# Patient Record
Sex: FEMALE | Race: WHITE | Employment: FULL TIME | URBAN - NONMETROPOLITAN AREA
[De-identification: names, ages, dates, MRNs, and addresses within clinical notes are randomized per-mention and may not be internally consistent; named-entity substitution may affect disease eponyms.]

---

## 2017-10-24 ENCOUNTER — HOSPITAL ENCOUNTER (OUTPATIENT)
Dept: WOMENS IMAGING | Age: 42
Discharge: HOME | End: 2017-10-24
Payer: COMMERCIAL

## 2017-10-24 DIAGNOSIS — Z12.31 VISIT FOR SCREENING MAMMOGRAM: ICD-10-CM

## 2017-10-24 PROCEDURE — 77063 BREAST TOMOSYNTHESIS BI: CPT

## 2020-12-15 ENCOUNTER — OFFICE VISIT (OUTPATIENT)
Dept: OBGYN CLINIC | Age: 45
End: 2020-12-15
Payer: COMMERCIAL

## 2020-12-15 VITALS
SYSTOLIC BLOOD PRESSURE: 104 MMHG | BODY MASS INDEX: 28.12 KG/M2 | HEIGHT: 65 IN | DIASTOLIC BLOOD PRESSURE: 74 MMHG | WEIGHT: 168.8 LBS

## 2020-12-15 PROCEDURE — 99213 OFFICE O/P EST LOW 20 MIN: CPT | Performed by: NURSE PRACTITIONER

## 2020-12-15 RX ORDER — CEPHALEXIN 500 MG/1
500 CAPSULE ORAL 2 TIMES DAILY
Qty: 14 CAPSULE | Refills: 0 | Status: SHIPPED | OUTPATIENT
Start: 2020-12-15 | End: 2020-12-22

## 2020-12-15 ASSESSMENT — ENCOUNTER SYMPTOMS: ROS SKIN COMMENTS: LUMP IN LEFT ARMPIT

## 2020-12-15 NOTE — PROGRESS NOTES
PROBLEM VISIT     Date of service: 12/15/2020    Crystal Ambrosio  Is a 39 y.o.  female    PT's PCP is: Ronald Christine     : 1975                                         Chaperone for Intimate Exam  ? Chaperone was offered as part of the rooming process. Patient declined and agrees to continue with exam without a chaperone. Subjective:       Patient's last menstrual period was 2020 (approximate). OB History    Para Term  AB Living   3 3       3   SAB TAB Ectopic Molar Multiple Live Births             3      # Outcome Date GA Lbr Zane/2nd Weight Sex Delivery Anes PTL Lv   3 Para            2 Para            1 Para                 Social History     Tobacco Use   Smoking Status Never Smoker   Smokeless Tobacco Never Used        Social History     Substance and Sexual Activity   Alcohol Use Yes    Comment: occasional       Allergies: Patient has no known allergies. Current Outpatient Medications:     cephALEXin (KEFLEX) 500 MG capsule, Take 1 capsule by mouth 2 times daily for 7 days, Disp: 14 capsule, Rfl: 0    Social History     Substance and Sexual Activity   Sexual Activity Yes    Partners: Male    Birth control/protection: Surgical       Chief Complaint   Patient presents with    Breast Problem     Pt c/o bump underarm in left arm. Pt states were very tender during the start of last cycle which is not normal. Mammogram 2019. PE:  Vital Signs  Blood pressure 104/74, height 5' 5\" (1.651 m), weight 168 lb 12.8 oz (76.6 kg), last menstrual period 2020. HPI: Patient here with complaints of tender axilla lump x2 days. Reports tenderness has increased today. PT denies fever, chills, nausea and vomiting       Review of Systems   Constitutional: Negative. Genitourinary: Negative. Skin:        Lump in left armpit       Physical Exam  Constitutional:       Appearance: Normal appearance. HENT:      Head: Normocephalic. Musculoskeletal: Normal range of motion. Skin:     Comments: Left axilla cyst centrally located, smaller than pea, tender    Neurological:      General: No focal deficit present. Mental Status: She is alert. Psychiatric:         Mood and Affect: Mood normal.         Behavior: Behavior normal.         Assessment and Plan          Diagnosis Orders   1. Sebaceous cyst of left axilla  cephALEXin (KEFLEX) 500 MG capsule       Patient to complete antibiotics and use warm compresses to area. If axilla cyst does not resolve patient is to notify office. I am having Janiepatsy Lelo start on cephALEXin. Return if symptoms worsen or fail to improve. There are no Patient Instructions on file for this visit. Over 50% of time spent on counseling and care coordination on: see assessment and plan,  She was also counseled on her preventative health maintenance recommendations and follow-up.         FF time: 15 min      Eligio Robles,12/15/2020 9:11 PM

## 2021-01-07 ENCOUNTER — HOSPITAL ENCOUNTER (OUTPATIENT)
Age: 46
Setting detail: SPECIMEN
Discharge: HOME | End: 2021-01-07
Payer: COMMERCIAL

## 2021-01-07 ENCOUNTER — OFFICE VISIT (OUTPATIENT)
Dept: OBGYN CLINIC | Age: 46
End: 2021-01-07
Payer: COMMERCIAL

## 2021-01-07 VITALS
HEIGHT: 66 IN | DIASTOLIC BLOOD PRESSURE: 80 MMHG | BODY MASS INDEX: 27.16 KG/M2 | WEIGHT: 169 LBS | SYSTOLIC BLOOD PRESSURE: 108 MMHG

## 2021-01-07 DIAGNOSIS — Z01.419 WOMEN'S ANNUAL ROUTINE GYNECOLOGICAL EXAMINATION: Primary | ICD-10-CM

## 2021-01-07 PROCEDURE — 99396 PREV VISIT EST AGE 40-64: CPT | Performed by: OBSTETRICS & GYNECOLOGY

## 2021-01-07 ASSESSMENT — ENCOUNTER SYMPTOMS
DIARRHEA: 0
ABDOMINAL PAIN: 0
CONSTIPATION: 0
SHORTNESS OF BREATH: 0

## 2021-01-07 NOTE — PROGRESS NOTES
DATE OF VISIT:  21  PATIENT NAME:  Riya Hatfield     YOB: 1975    39 y.o. Chief Complaint   Patient presents with    Gynecologic Exam     Pt. due for pap. Last mamm . Pt. denies concerns. Pt. had a mamm scheduled in Dec. but had to cancel it because she got Covid. Patient's last menstrual period was 2020 (approximate). Primary Care Physician: Doroteo Bansal    The patient was seen and examined. She has no chiefcomplaint today and is here for her annual exam.  Her bowels are regular. There are no voiding complaints. She denies any bloating. She denies vaginal discharge and was counseled on STD's and the need for barriercontraception. HPI : Riya Hatfield is a 39 y.o. female  who presents today for her annual.    ______________________________________________________________________    OB History    Para Term  AB Living   3 3       3   SAB TAB Ectopic Molar Multiple Live Births             3      # Outcome Date GA Lbr Zane/2nd Weight Sex Delivery Anes PTL Lv   3 Para            2 Para            1 Para              History reviewed. No pertinent past medical history.                                                                 Past Surgical History:   Procedure Laterality Date    BREAST BIOPSY      TUBAL LIGATION  2020     Family History   Problem Relation Age of Onset    Lung Cancer Paternal Uncle      Social History     Socioeconomic History    Marital status:      Spouse name: Not on file    Number of children: Not on file    Years of education: Not on file    Highest education level: Not on file   Occupational History    Not on file   Social Needs    Financial resource strain: Not on file    Food insecurity     Worry: Not on file     Inability: Not on file    Transportation needs     Medical: Not on file     Non-medical: Not on file   Tobacco Use    Smoking status: Never Smoker  Smokeless tobacco: Never Used   Substance and Sexual Activity    Alcohol use: Yes     Comment: occasional    Drug use: Never    Sexual activity: Yes     Partners: Male     Birth control/protection: Surgical   Lifestyle    Physical activity     Days per week: Not on file     Minutes per session: Not on file    Stress: Not on file   Relationships    Social connections     Talks on phone: Not on file     Gets together: Not on file     Attends Judaism service: Not on file     Active member of club or organization: Not on file     Attends meetings of clubs or organizations: Not on file     Relationship status: Not on file    Intimate partner violence     Fear of current or ex partner: Not on file     Emotionally abused: Not on file     Physically abused: Not on file     Forced sexual activity: Not on file   Other Topics Concern    Not on file   Social History Narrative    Not on file     Vitals:    01/07/21 1046   BP: 108/80   Site: Right Upper Arm   Position: Sitting   Weight: 169 lb (76.7 kg)   Height: 5' 5.5\" (1.664 m)     Body mass index is 27.7 kg/m². Patient's last menstrual period was 12/17/2020 (approximate). MEDICATIONS:  No current outpatient medications on file. No current facility-administered medications for this visit. ALLERGIES:  Allergies as of 01/07/2021    (No Known Allergies)           Symptoms of decreased mood absent    **If either question is answered in a  positive fashion then completethe PHQ9 Scoring Evaluation and make the appropriate referral**      Gynecologic History:       Patient's last menstrual period was 12/17/2020 (approximate). Sexually Active: Yes    STD History: No     Permanent Sterilization:Yes    Reversible Birth Control: No        Hormone Replacement Exposure: No      Genetic Qualified Family History of Breast, Ovarian , Colon or Uterine Cancer:No   If YES see scanned worksheet.     Preventative Health Testing:  Colposcopy History: Date of Last Mammogram: 2019  Date of Last Colonoscopy:   Date of Last Bone Density:      ______________________________________________________________________    REVIEW OF SYSTEMS:       Review of Systems   Constitutional: Negative for chills, fatigue and fever. Respiratory: Negative for shortness of breath. Cardiovascular: Negative for chest pain. Gastrointestinal: Negative for abdominal pain, constipation and diarrhea. Genitourinary: Negative for dysuria, enuresis, frequency, menstrual problem, pelvic pain, urgency and vaginal bleeding. Neurological: Negative for dizziness, light-headedness and headaches. PHYSICAL EXAM:    Physical Exam  Constitutional:       Appearance: Normal appearance. Genitourinary:      Pelvic exam was performed with patient in the lithotomy position. Vulva, vagina, cervix, uterus, right adnexa and left adnexa normal.   HENT:      Head: Atraumatic. Mouth/Throat:      Mouth: Mucous membranes are moist.   Eyes:      Extraocular Movements: Extraocular movements intact. Pupils: Pupils are equal, round, and reactive to light. Neck:      Musculoskeletal: Normal range of motion. Cardiovascular:      Rate and Rhythm: Normal rate. Pulmonary:      Effort: Pulmonary effort is normal.   Chest:      Breasts:         Right: Normal.         Left: Normal.   Abdominal:      General: There is no distension. Palpations: Abdomen is soft. Tenderness: There is no abdominal tenderness. Musculoskeletal: Normal range of motion. Neurological:      Mental Status: She is alert and oriented to person, place, and time. Skin:     General: Skin is warm and dry. Psychiatric:         Mood and Affect: Mood normal.         Behavior: Behavior normal.         Thought Content: Thought content normal.         Judgment: Judgment normal.   Chaperone present: chaperone declined. POC Cultures:  No results found for this visit on 01/07/21.       ASSESSMENT: 39 y.o. Annual  1. Women's annual routine gynecological examination          There are no active problems to display for this patient. Hereditary Breast, Ovarian, Colon and Uterine Cancer screening Done. Tobacco & Secondary smoke risks reviewed; instructed on cessation and avoidance    PLAN:    Return in about 1 year (around 1/7/2022) for annual.  Orders Placed This Encounter   Procedures    PAP SMEAR       Repeat Annual every 1 year  Cervical Cytology Evaluation begins at 24years old. If Negative Cytology, Follow-up screening per current guidelines. Mammograms every 1year. If 37 yo and last mammogram was negative. Calcium and Vitamin D dosing reviewed. Colonoscopy screening reviewed as well as onset for bone density testing. Birth control and barrier recommendationsdiscussed. STD counseling and prevention reviewed. Gardisil counseling completed for all patients 7-33 yo. Routine healthmaintenance per patients PCP.     Electronicallysigned by:  Beth Gomez DO on 01/07/21

## 2021-01-12 LAB
HPV SAMPLE: NORMAL
HPV, GENOTYPE 16: NOT DETECTED
HPV, GENOTYPE 18: NOT DETECTED
HPV, HIGH RISK OTHER: NOT DETECTED
HPV, INTERPRETATION: NORMAL
SPECIMEN DESCRIPTION: NORMAL

## 2021-01-15 LAB — CYTOLOGY REPORT: NORMAL

## 2022-01-12 ENCOUNTER — OFFICE VISIT (OUTPATIENT)
Dept: OBGYN CLINIC | Age: 47
End: 2022-01-12
Payer: COMMERCIAL

## 2022-01-12 VITALS
WEIGHT: 178 LBS | SYSTOLIC BLOOD PRESSURE: 108 MMHG | DIASTOLIC BLOOD PRESSURE: 80 MMHG | HEIGHT: 66 IN | BODY MASS INDEX: 28.61 KG/M2

## 2022-01-12 DIAGNOSIS — Z01.419 WOMEN'S ANNUAL ROUTINE GYNECOLOGICAL EXAMINATION: Primary | ICD-10-CM

## 2022-01-12 PROCEDURE — 99396 PREV VISIT EST AGE 40-64: CPT | Performed by: OBSTETRICS & GYNECOLOGY

## 2022-01-12 ASSESSMENT — ENCOUNTER SYMPTOMS
ABDOMINAL PAIN: 0
CONSTIPATION: 0
DIARRHEA: 0
SHORTNESS OF BREATH: 0

## 2022-01-12 NOTE — PROGRESS NOTES
YEARLY PHYSICAL    Date of service: 2022    Jessika Corona  Is a 55 y.o.   female    PT's PCP is: Bere Bustamante     : 1975                                         Chaperone for Intimate Exam   Chaperone was offered as part of the rooming process. Patient declined and agrees to continue with exam without a chaperone.  Chaperone: na      Subjective:       Patient's last menstrual period was 2021 (exact date). Are your menses regular: yes    OB History    Para Term  AB Living   3 3       3   SAB IAB Ectopic Molar Multiple Live Births             3      # Outcome Date GA Lbr Zane/2nd Weight Sex Delivery Anes PTL Lv   3 Para            2 Para            1 Para                 Social History     Tobacco Use   Smoking Status Never Smoker   Smokeless Tobacco Never Used        Social History     Substance and Sexual Activity   Alcohol Use Yes    Comment: occasional       Family History   Problem Relation Age of Onset    Lung Cancer Paternal Uncle        Any family history of breast or ovarian cancer: No    Any family history of blood clots: No      Allergies: Patient has no known allergies. No current outpatient medications on file. Social History     Substance and Sexual Activity   Sexual Activity Yes    Partners: Male    Birth control/protection: Surgical       Any bleeding or pain with intercourse: No    Last Yearly:  2021    Last pap: 2021 NL    Last HPV: 2020 Neg    Last Mammogram: 2021. Next mamm is scheduled next week. Do you do self breast exams: No    History reviewed. No pertinent past medical history. Past Surgical History:   Procedure Laterality Date    BREAST BIOPSY      TUBAL LIGATION  2020       Family History   Problem Relation Age of Onset    Lung Cancer Paternal Uncle        Chief Complaint   Patient presents with    Annual Exam     Pt. denies concerns. PE:  Vital Signs  Blood pressure 108/80, height 5' 5.75\" (1.67 m), weight 178 lb (80.7 kg), last menstrual period 12/26/2021. Estimated body mass index is 28.95 kg/m² as calculated from the following:    Height as of this encounter: 5' 5.75\" (1.67 m). Weight as of this encounter: 178 lb (80.7 kg). Labs:    No results found for this visit on 01/12/22. No data recorded    NURSE: Mario Hammans    HPI: here for annual exam    Review of Systems   Constitutional: Negative for chills, fatigue and fever. Respiratory: Negative for shortness of breath. Cardiovascular: Negative for chest pain. Gastrointestinal: Negative for abdominal pain, constipation and diarrhea. Genitourinary: Negative for dysuria, enuresis, frequency, menstrual problem, pelvic pain, urgency and vaginal bleeding. Neurological: Negative for dizziness, light-headedness and headaches. Physical Exam  Constitutional:       General: She is not in acute distress. Appearance: Normal appearance. She is not ill-appearing. Genitourinary:      Vulva normal.      No vaginal discharge. Right Adnexa: not tender. Left Adnexa: not tender. Uterus is not tender. Breasts:      Right: No mass, nipple discharge, skin change or tenderness. Left: No mass, nipple discharge, skin change or tenderness. HENT:      Head: Normocephalic. Cardiovascular:      Rate and Rhythm: Normal rate and regular rhythm. Pulmonary:      Effort: Pulmonary effort is normal.      Breath sounds: Normal breath sounds. Abdominal:      Palpations: Abdomen is soft. Tenderness: There is no abdominal tenderness. There is no guarding or rebound. Musculoskeletal:         General: Normal range of motion. Neurological:      General: No focal deficit present. Mental Status: She is alert.    Psychiatric:         Mood and Affect: Mood normal.         Behavior: Behavior normal. Assessment and Plan          Diagnosis Orders   1. Women's annual routine gynecological examination         Repeat Annual every 1 year  Cervical Cytology Evaluation begins at 24years old. If Negative Cytology, Follow-up screening per current guidelines. Mammograms every 1year. If 37 yo and last mammogram was negative. Routine healthmaintenance per patients PCP. Anjel Fernandez does not currently have medications on file. Return in about 1 year (around 1/12/2023) for annual.    She was also counseled on her preventative health maintenance recommendations and follow-up. There are no Patient Instructions on file for this visit.     SUSHILA Elizalde,4/22/0405 2:58 PM

## 2023-01-18 ENCOUNTER — OFFICE VISIT (OUTPATIENT)
Dept: OBGYN CLINIC | Age: 48
End: 2023-01-18
Payer: COMMERCIAL

## 2023-01-18 VITALS
HEIGHT: 66 IN | SYSTOLIC BLOOD PRESSURE: 115 MMHG | DIASTOLIC BLOOD PRESSURE: 78 MMHG | WEIGHT: 175 LBS | BODY MASS INDEX: 28.12 KG/M2

## 2023-01-18 DIAGNOSIS — Z01.419 WOMEN'S ANNUAL ROUTINE GYNECOLOGICAL EXAMINATION: Primary | ICD-10-CM

## 2023-01-18 PROCEDURE — 99396 PREV VISIT EST AGE 40-64: CPT | Performed by: OBSTETRICS & GYNECOLOGY

## 2023-01-18 ASSESSMENT — ENCOUNTER SYMPTOMS
DIARRHEA: 0
SHORTNESS OF BREATH: 0
CONSTIPATION: 0
ABDOMINAL PAIN: 0

## 2023-01-18 NOTE — PROGRESS NOTES
YEARLY PHYSICAL    Date of service: 2023    Saige Crandall  Is a 52 y.o.   female    PT's PCP is: Vy Dickens MD     : 1975                                         Chaperone for Intimate Exam  Chaperone was offered as part of the rooming process. Patient declined and agrees to continue with exam without a chaperone. Chaperone: N/A      Subjective:       Patient's last menstrual period was 01/10/2023 (exact date). Are your menses regular: yes    OB History    Para Term  AB Living   3 3       3   SAB IAB Ectopic Molar Multiple Live Births             3      # Outcome Date GA Lbr Zane/2nd Weight Sex Delivery Anes PTL Lv   3 Para            2 Para            1 Para                 Social History     Tobacco Use   Smoking Status Never   Smokeless Tobacco Never        Social History     Substance and Sexual Activity   Alcohol Use Yes    Comment: occasional       Family History   Problem Relation Age of Onset    Lung Cancer Paternal Uncle        Any family history of breast or ovarian cancer: No    Any family history of blood clots: No      Allergies: Patient has no known allergies. Current Outpatient Medications:     MULTIPLE VITAMIN PO, Take by mouth, Disp: , Rfl:     Social History     Substance and Sexual Activity   Sexual Activity Yes    Partners: Male    Birth control/protection: Surgical       Any bleeding or pain with intercourse: No    Last Yearly:  2022    Last pap: 2021 NL    Last HPV: 2021 NEG    Last Mammogram: 2022, scheduled for     Do you do self breast exams: Yes    History reviewed. No pertinent past medical history.     Past Surgical History:   Procedure Laterality Date    BREAST BIOPSY      TUBAL LIGATION  2020       Family History   Problem Relation Age of Onset    Lung Cancer Paternal Uncle        Chief Complaint   Patient presents with    Annual Exam     Patient reports very tender breasts at time of her cycle a couple of months ago.  She then got a rash around her abdomen and had some spots on her breast. She was diagnosed with folliculitis. She has not had the breast tenderness the past few cycles.           PE:  Vital Signs  Blood pressure 115/78, height 5' 6\" (1.676 m), weight 175 lb (79.4 kg), last menstrual period 01/10/2023.  Estimated body mass index is 28.25 kg/m² as calculated from the following:    Height as of this encounter: 5' 6\" (1.676 m).    Weight as of this encounter: 175 lb (79.4 kg).    Labs:    No results found for this visit on 01/18/23.    No data recorded    NURSE: Bobbi PHELPS    HPI: here for annual exam    Review of Systems   Constitutional:  Negative for chills, fatigue and fever.   Respiratory:  Negative for shortness of breath.    Cardiovascular:  Negative for chest pain.   Gastrointestinal:  Negative for abdominal pain, constipation and diarrhea.   Genitourinary:  Negative for dysuria, enuresis, frequency, menstrual problem, pelvic pain, urgency and vaginal bleeding.   Neurological:  Negative for dizziness, light-headedness and headaches.     Physical Exam  Constitutional:       Appearance: Normal appearance.   Genitourinary:      Vulva, bladder and urethral meatus normal.      Right Labia: No rash or lesions.     Left Labia: No lesions or rash.     No labial fusion noted.      No vaginal discharge.      No vaginal prolapse present.     No vaginal atrophy present.       Right Adnexa: not tender and no mass present.     Left Adnexa: not tender and no mass present.     No cervical motion tenderness, discharge, friability or lesion.      No parametrium nodularity present.     Uterus is not enlarged or tender.   Breasts:     Breasts are soft.     Right: No inverted nipple, mass, nipple discharge, skin change or tenderness.      Left: No inverted nipple, mass, nipple discharge, skin change or tenderness.   HENT:      Head:  Normocephalic and atraumatic. Eyes:      Extraocular Movements: Extraocular movements intact. Pupils: Pupils are equal, round, and reactive to light. Cardiovascular:      Rate and Rhythm: Normal rate. Pulmonary:      Effort: Pulmonary effort is normal.   Abdominal:      General: There is no distension. Palpations: Abdomen is soft. There is no mass. Tenderness: There is no abdominal tenderness. Musculoskeletal:         General: Normal range of motion. Cervical back: Normal range of motion. Neurological:      General: No focal deficit present. Mental Status: She is alert and oriented to person, place, and time. Skin:     General: Skin is warm and dry. Psychiatric:         Mood and Affect: Mood normal.         Behavior: Behavior normal.         Thought Content: Thought content normal.         Judgment: Judgment normal.                                 Assessment and Plan          Diagnosis Orders   1. Women's annual routine gynecological examination            Repeat Annual every 1 year  Cervical Cytology Evaluation begins at 24years old. If Negative Cytology, Follow-up screening per current guidelines. Mammograms every 1year. If 35 yo and last mammogram was negative. Routine healthmaintenance per patients PCP. I am having Lucy Carvalho maintain her MULTIPLE VITAMIN PO. Return in about 1 year (around 1/18/2024) for annual.    She was also counseled on her preventative health maintenance recommendations and follow-up. There are no Patient Instructions on file for this visit.     Krysta Wade DO,1/18/2023 3:11 PM

## 2024-07-03 ENCOUNTER — OFFICE VISIT (OUTPATIENT)
Dept: OBGYN CLINIC | Age: 49
End: 2024-07-03
Payer: COMMERCIAL

## 2024-07-03 ENCOUNTER — HOSPITAL ENCOUNTER (OUTPATIENT)
Age: 49
Setting detail: SPECIMEN
Discharge: HOME | End: 2024-07-03

## 2024-07-03 VITALS
HEIGHT: 66 IN | BODY MASS INDEX: 28.61 KG/M2 | WEIGHT: 178 LBS | SYSTOLIC BLOOD PRESSURE: 114 MMHG | DIASTOLIC BLOOD PRESSURE: 78 MMHG

## 2024-07-03 DIAGNOSIS — Z01.419 WOMEN'S ANNUAL ROUTINE GYNECOLOGICAL EXAMINATION: Primary | ICD-10-CM

## 2024-07-03 PROCEDURE — 99396 PREV VISIT EST AGE 40-64: CPT | Performed by: OBSTETRICS & GYNECOLOGY

## 2024-07-03 ASSESSMENT — ENCOUNTER SYMPTOMS
CONSTIPATION: 0
ABDOMINAL PAIN: 0
SHORTNESS OF BREATH: 0
DIARRHEA: 0

## 2024-07-03 NOTE — PROGRESS NOTES
YEARLY PHYSICAL    Date of service: 7/3/2024    Agata Grant  Is a 48 y.o. female    PT's PCP is: Mik Bateman MD     : 1975                                         Chaperone for Intimate Exam  Chaperone was offered as part of the rooming process. Patient declined and agrees to continue with exam without a chaperone.  Chaperone: N/A      Subjective:       Patient's last menstrual period was 2024 (exact date).     Are your menses regular: no    OB History    Para Term  AB Living   3 3       3   SAB IAB Ectopic Molar Multiple Live Births             3      # Outcome Date GA Lbr Zane/2nd Weight Sex Delivery Anes PTL Lv   3 Para            2 Para            1 Para                 Social History     Tobacco Use   Smoking Status Never   Smokeless Tobacco Never        Social History     Substance and Sexual Activity   Alcohol Use Yes    Comment: occasional       Family History   Problem Relation Age of Onset    Lung Cancer Paternal Uncle        Any family history of breast or ovarian cancer: No    Any family history of blood clots: No      Allergies: Patient has no known allergies.      Current Outpatient Medications:     MULTIPLE VITAMIN PO, Take by mouth, Disp: , Rfl:     Social History     Substance and Sexual Activity   Sexual Activity Yes    Partners: Male    Birth control/protection: Surgical       Any bleeding or pain with intercourse: No    Last Yearly:  2023    Last pap:     Last HPV:     Last Mammogram: 2023    Last colorectal screen- type:colonoscopy  date:     Do you do self breast exams: No    History reviewed. No pertinent past medical history.    Past Surgical History:   Procedure Laterality Date    BREAST BIOPSY      TUBAL LIGATION  2020       Family History   Problem Relation Age of Onset    Lung Cancer Paternal Uncle        Chief Complaint   Patient presents with

## 2024-07-06 LAB
HPV I/H RISK 4 DNA CVX QL NAA+PROBE: NOT DETECTED
HPV SAMPLE: NORMAL
HPV, INTERPRETATION: NORMAL
HPV16 DNA CVX QL NAA+PROBE: NOT DETECTED
HPV18 DNA CVX QL NAA+PROBE: NOT DETECTED
SPECIMEN DESCRIPTION: NORMAL

## 2024-07-11 LAB — CYTOLOGY REPORT: NORMAL

## 2025-02-11 LAB — MAMMOGRAPHY, EXTERNAL: NORMAL

## 2025-07-16 ENCOUNTER — OFFICE VISIT (OUTPATIENT)
Dept: OBGYN CLINIC | Age: 50
End: 2025-07-16
Payer: COMMERCIAL

## 2025-07-16 VITALS
WEIGHT: 179.2 LBS | BODY MASS INDEX: 28.8 KG/M2 | SYSTOLIC BLOOD PRESSURE: 116 MMHG | HEIGHT: 66 IN | DIASTOLIC BLOOD PRESSURE: 70 MMHG

## 2025-07-16 DIAGNOSIS — Z01.419 WOMEN'S ANNUAL ROUTINE GYNECOLOGICAL EXAMINATION: Primary | ICD-10-CM

## 2025-07-16 DIAGNOSIS — N63.21 UNSPECIFIED LUMP IN THE LEFT BREAST, UPPER OUTER QUADRANT: ICD-10-CM

## 2025-07-16 DIAGNOSIS — N92.6 IRREGULAR MENSES: ICD-10-CM

## 2025-07-16 PROCEDURE — 99396 PREV VISIT EST AGE 40-64: CPT | Performed by: NURSE PRACTITIONER

## 2025-07-16 ASSESSMENT — ENCOUNTER SYMPTOMS
SHORTNESS OF BREATH: 0
ABDOMINAL PAIN: 0
CONSTIPATION: 0
DIARRHEA: 0

## 2025-07-16 NOTE — PROGRESS NOTES
YEARLY PHYSICAL    Date of service: 2025    Agata Grant  Is a 49 y.o. female    PT's PCP is: Mik Bateman MD     : 1975                                         Chaperone for Intimate Exam  Chaperone was offered as part of the rooming process. Patient declined and agrees to continue with exam without a chaperone.  Chaperone: n/a      Subjective:       Patient's last menstrual period was 2025.     Are your menses regular: no    OB History    Para Term  AB Living   3 3    3   SAB IAB Ectopic Molar Multiple Live Births        3      # Outcome Date GA Lbr Zane/2nd Weight Sex Type Anes PTL Lv   3 Para            2 Para            1 Para                 Social History     Tobacco Use   Smoking Status Never   Smokeless Tobacco Never        Social History     Substance and Sexual Activity   Alcohol Use Yes    Comment: occasional       Family History   Problem Relation Age of Onset    Lung Cancer Paternal Uncle        Any family history of breast or ovarian cancer: No    Any family history of blood clots: No      Allergies: Patient has no known allergies.      Current Outpatient Medications:     MULTIPLE VITAMIN PO, Take by mouth, Disp: , Rfl:     Social History     Substance and Sexual Activity   Sexual Activity Yes    Partners: Male    Birth control/protection: Surgical       Any bleeding or pain with intercourse: No    Last Yearly:  7/3/24    Last pap: 7/3/24-neg    Last HPV: 7/3/24-neg    Last Mammogram: 25    Last Dexascan n/a    Last colorectal screen- colonoscopy     Do you do self breast exams: encouraged     History reviewed. No pertinent past medical history.    Past Surgical History:   Procedure Laterality Date    BREAST BIOPSY      TUBAL LIGATION  2020       Family History   Problem Relation Age of Onset    Lung Cancer Paternal Uncle        Chief Complaint   Patient presents with

## 2025-07-29 DIAGNOSIS — N63.21 UNSPECIFIED LUMP IN THE LEFT BREAST, UPPER OUTER QUADRANT: ICD-10-CM
